# Patient Record
Sex: FEMALE | Race: WHITE | Employment: FULL TIME | URBAN - METROPOLITAN AREA
[De-identification: names, ages, dates, MRNs, and addresses within clinical notes are randomized per-mention and may not be internally consistent; named-entity substitution may affect disease eponyms.]

---

## 2021-07-07 ENCOUNTER — OFFICE VISIT (OUTPATIENT)
Dept: FAMILY MEDICINE CLINIC | Age: 26
End: 2021-07-07
Payer: COMMERCIAL

## 2021-07-07 VITALS
OXYGEN SATURATION: 99 % | DIASTOLIC BLOOD PRESSURE: 62 MMHG | WEIGHT: 175 LBS | SYSTOLIC BLOOD PRESSURE: 120 MMHG | HEART RATE: 87 BPM | HEIGHT: 68 IN | BODY MASS INDEX: 26.52 KG/M2

## 2021-07-07 DIAGNOSIS — K92.1 HEMATOCHEZIA: Primary | ICD-10-CM

## 2021-07-07 PROCEDURE — 99204 OFFICE O/P NEW MOD 45 MIN: CPT | Performed by: FAMILY MEDICINE

## 2021-07-07 RX ORDER — COPPER 313.4 MG/1
1 INTRAUTERINE DEVICE INTRAUTERINE ONCE
COMMUNITY
End: 2022-03-16

## 2021-07-07 SDOH — ECONOMIC STABILITY: FOOD INSECURITY: WITHIN THE PAST 12 MONTHS, THE FOOD YOU BOUGHT JUST DIDN'T LAST AND YOU DIDN'T HAVE MONEY TO GET MORE.: NEVER TRUE

## 2021-07-07 SDOH — ECONOMIC STABILITY: FOOD INSECURITY: WITHIN THE PAST 12 MONTHS, YOU WORRIED THAT YOUR FOOD WOULD RUN OUT BEFORE YOU GOT MONEY TO BUY MORE.: NEVER TRUE

## 2021-07-07 ASSESSMENT — PATIENT HEALTH QUESTIONNAIRE - PHQ9
SUM OF ALL RESPONSES TO PHQ9 QUESTIONS 1 & 2: 0
SUM OF ALL RESPONSES TO PHQ QUESTIONS 1-9: 0
2. FEELING DOWN, DEPRESSED OR HOPELESS: 0
1. LITTLE INTEREST OR PLEASURE IN DOING THINGS: 0

## 2021-07-07 ASSESSMENT — ENCOUNTER SYMPTOMS
RECTAL PAIN: 0
VOMITING: 0
SHORTNESS OF BREATH: 0
SORE THROAT: 0
BLOOD IN STOOL: 1
CHEST TIGHTNESS: 0
DIARRHEA: 0
RHINORRHEA: 0
NAUSEA: 0
ABDOMINAL DISTENTION: 0
CONSTIPATION: 0
ABDOMINAL PAIN: 0
ANAL BLEEDING: 1

## 2021-07-07 ASSESSMENT — SOCIAL DETERMINANTS OF HEALTH (SDOH): HOW HARD IS IT FOR YOU TO PAY FOR THE VERY BASICS LIKE FOOD, HOUSING, MEDICAL CARE, AND HEATING?: NOT HARD AT ALL

## 2021-07-07 NOTE — PROGRESS NOTES
Subjective:   Patient ID: Christine Vega is a 31 y.o. female here today to establish care. HPI by clinical support staff:   Chief Complaint   Patient presents with    New Patient    Rectal Bleeding     Menstrual period last week, but noticed blood in stool yesterday morning as well as this morning. History of yeast infections. Preliminary data above this line collected by clinical support staff.    ______________________________________________________________________  HPI by Provider:   HPI   Patient presents to establish care. History reviewed and updated with patient today. Reports noticing blood in her stool a week ago but was on her period so didn't think much of it. Noticed BRB in the toilet bowl yesterday and no blood in vaginal vault. Denies pain with defecation, diarrhea or history of hemorrhoids. Has a family history of laboy syndrome in maternal aunt at a young age (35s). Had beet juice but before this occurrence. No treatment so far. Data above this line collected by Provider. Patient's medications, allergies, past medical, surgical, social and family histories were reviewed and updated as appropriate. Patient Care Team:  Fabiola Macdonald MD as PCP - General (Family Medicine)    No Known Allergies    Current Outpatient Medications on File Prior to Visit   Medication Sig Dispense Refill    Lisdexamfetamine Dimesylate (VYVANSE) 20 MG CAPS Take 20 mg by mouth daily as needed.  Paragard Intrauterine Copper IUD 1 each by Intrauterine route once       No current facility-administered medications on file prior to visit. Review of Systems   Constitutional: Negative for activity change, appetite change, chills, fatigue and fever. HENT: Negative for congestion, rhinorrhea and sore throat. Respiratory: Negative for chest tightness and shortness of breath. Cardiovascular: Negative for chest pain, palpitations and leg swelling.    Gastrointestinal: Positive for anal bleeding and blood in stool. Negative for abdominal distention, abdominal pain, constipation, diarrhea, nausea, rectal pain and vomiting. Genitourinary: Negative for dysuria, frequency, hematuria, menstrual problem, pelvic pain, vaginal bleeding and vaginal discharge. Musculoskeletal: Negative for arthralgias. Neurological: Negative for dizziness, weakness and headaches. Psychiatric/Behavioral: Negative for hallucinations. All other systems reviewed and are negative. ROS above this line reviewed by Provider. Objective:   /62 (Site: Right Upper Arm, Position: Sitting, Cuff Size: Small Adult)   Pulse 87   Ht 5' 8\" (1.727 m)   Wt 175 lb (79.4 kg)   SpO2 99%   BMI 26.61 kg/m²   Physical Exam  Vitals and nursing note reviewed. Constitutional:       General: She is not in acute distress. Appearance: Normal appearance. She is not ill-appearing, toxic-appearing or diaphoretic. HENT:      Head: Normocephalic and atraumatic. Mouth/Throat:      Mouth: Mucous membranes are moist.      Pharynx: Oropharynx is clear. No oropharyngeal exudate or posterior oropharyngeal erythema. Eyes:      General: No scleral icterus. Conjunctiva/sclera: Conjunctivae normal.   Cardiovascular:      Rate and Rhythm: Normal rate and regular rhythm. Heart sounds: Normal heart sounds. No murmur heard. No friction rub. No gallop. Pulmonary:      Effort: Pulmonary effort is normal.   Abdominal:      General: Abdomen is flat. Bowel sounds are normal. There is no distension. Palpations: Abdomen is soft. Tenderness: There is no abdominal tenderness. There is no guarding. Genitourinary:     Rectum: Normal. Guaiac result positive. Musculoskeletal:      Cervical back: Normal range of motion. Skin:     General: Skin is warm and dry. Capillary Refill: Capillary refill takes less than 2 seconds. Neurological:      Mental Status: She is alert.    Psychiatric:         Mood and Affect: Mood normal. Behavior: Behavior normal.       No results found for: WBC, HGB, HCT, MCV, PLT  No results found for: NA, K, BUN, CREATININE, GLUCOSE, CALCIUM, BILITOT, ALKPHOS, AST, ALT, GFRAA  No results found for: TSHFT4, TSH, FT3  No results found for: LABA1C  No results found for: EAG  No results found for: CHOL, TRIG, HDL, LDLCHOLESTEROL, CHOLHDLRATIO  No results found for: LABMICR, IUPD50EDU  No results found for: VITD25  Assessment and Plan:   1. Hematochezia  Patient showed me pictures of stool covered in BRB, rectal exam unrevealing- no external hemorrhoids. Advised increasing fluids and fiber. Referred given family history of Wade syndrome. - [facility]         This chart note was prepared using a voice recognition dictation program. This note was reviewed for accuracy; however, addition, deletion and sound-alike word errors may occur. If there are any questions regarding this chart note, please contact the originating provider. Electronically signed by   Edgar Dooley MD  7/7/2021   8:53 PM    No follow-ups on file.

## 2021-07-07 NOTE — PATIENT INSTRUCTIONS
Patient Education        Rectal Bleeding: Care Instructions  Your Care Instructions     Rectal bleeding in small amounts is common. You may see red spotting on toilet paper or drops of blood in the toilet. Rectal bleeding has many possible causes, from something as minor as hemorrhoids to something as serious as colon cancer. You may need more tests to find the cause of your bleeding. Follow-up care is a key part of your treatment and safety. Be sure to make and go to all appointments, and call your doctor if you are having problems. It's also a good idea to know your test results and keep a list of the medicines you take. How can you care for yourself at home? · Avoid aspirin and other nonsteroidal anti-inflammatory drugs (NSAIDs), such as ibuprofen (Advil, Motrin) and naproxen (Aleve). They can cause you to bleed more. Ask your doctor if you can take acetaminophen (Tylenol). Read and follow all instructions on the label. · Use a stool softener that contains bran or psyllium. You can save money by buying bran or psyllium (available in bulk at most health food stores) and sprinkling it on foods or stirring it into fruit juice. You can also use a product such as Metamucil or Citrucel. · Take your medicines exactly as directed. Call your doctor if you think you are having a problem with your medicine. When should you call for help? Call 911 anytime you think you may need emergency care. For example, call if:    · You passed out (lost consciousness). Call your doctor now or seek immediate medical care if:    · You have new or worse pain.     · You have new or worse bleeding from the rectum.     · You are dizzy or light-headed, or you feel like you may faint. Watch closely for changes in your health, and be sure to contact your doctor if:    · You cannot pass stools or gas.     · You do not get better as expected. Where can you learn more? Go to [email or web address].healthPlaceILive.com. org and sign in to your [employer] account. Enter U375 in the [facility] box to learn more about \"Rectal Bleeding: Care Instructions. \"     If you do not have an account, please click on the \"Sign Up Now\" link. Current as of: February 10, 2021               Content Version: 12.9  © 0358-6005 Healthwise, Incorporated. Care instructions adapted under license by [facility] If you have questions about a medical condition or this instruction, always ask your healthcare professional. [address] any warranty or liability for your use of this information.

## 2021-07-23 ENCOUNTER — OFFICE VISIT (OUTPATIENT)
Dept: SURGERY | Age: 26
End: 2021-07-23
Payer: COMMERCIAL

## 2021-07-23 VITALS
HEIGHT: 68 IN | SYSTOLIC BLOOD PRESSURE: 107 MMHG | HEART RATE: 73 BPM | OXYGEN SATURATION: 100 % | TEMPERATURE: 98.7 F | DIASTOLIC BLOOD PRESSURE: 71 MMHG | BODY MASS INDEX: 26.67 KG/M2 | WEIGHT: 176 LBS

## 2021-07-23 DIAGNOSIS — K62.5 RECTAL BLEEDING: Primary | ICD-10-CM

## 2021-07-23 PROCEDURE — 99203 OFFICE O/P NEW LOW 30 MIN: CPT | Performed by: SURGERY

## 2021-07-23 NOTE — Clinical Note
Corene Nageotte,    Thank you for the referral. Given her family history we will get her scheduled for colonoscopy +/- hemorrhoid treatment    Carmen Robbins

## 2021-07-23 NOTE — PROGRESS NOTES
Subjective:     Patient is a 31 y.o. woman with rectal bleeding and family history of Wade syndrome     The patient is referred by Dr. Claudette Holm MD. Results of consultation will be shared via electronic medical record    HPI: Ms. Rell Michelle reports several episodes of rectal bleeding and mucus over the course of this year. She reports she is regular without issues of constipation or diarrhea. She has had some issues with recurrent yeast infections. She rarely sees mucus on her stool. She denies current pregnancy. Patient Active Problem List    Diagnosis Date Noted    ADD (attention deficit disorder) 05/18/2015    Asthma 05/18/2015    Bigeminy 05/18/2015     Past Medical History:   Diagnosis Date    ADHD (attention deficit hyperactivity disorder)     Asthma       Past Surgical History:   Procedure Laterality Date    KNEE ARTHROSCOPY Right     TONSILLECTOMY AND ADENOIDECTOMY      WISDOM TOOTH EXTRACTION        Not in a hospital admission. No Known Allergies   Social History     Tobacco Use    Smoking status: Never Smoker    Smokeless tobacco: Never Used   Substance Use Topics    Alcohol use: Yes     Alcohol/week: 1.0 - 2.0 standard drinks     Types: 1 - 2 Standard drinks or equivalent per week      Family History   Problem Relation Age of Onset    High Blood Pressure Mother     No Known Problems Father     No Known Problems Sister     No Known Problems Brother     No Known Problems Maternal Grandmother     Heart Surgery Maternal Grandfather     Clotting Disorder Paternal Grandmother     Heart Surgery Paternal Grandfather     Coronary Art Dis Paternal Grandfather     Colon Cancer Maternal Aunt 39    Breast Cancer Maternal Aunt       Review of Systems    GEN: reviewed and negative except as noted in HPI. GI: reviewed and negative except as noted in HPI. + bleeding, no constipation or diarrhea.     Objective:     GEN: appears well, no distress, appears stated age  PSYCH: normal mood, normal affect  NECK: no neck masses, trachea midline  ENT: moist oral mucosa; anicteric  SKIN: no rash or jaundice  CV: regular heart rate and rhythm  PULM: normal respiratory effort, no wheezing  GI: soft non tender abdomen. Normal bowel sounds  RECTAL: deferred to time of colonoscopy   EXT/NEURO: normal gait, strength/sensation grossly intact in all extremities    Assessment:     Rectal bleeding     Plan:     Discussed in setting of FH of Wade syndrome and aunt with colon cancer in her 29's along with bleeding and mucus I recommend colonoscopy to exclude malignancy. Discussed we can perform hemorrhoid banding at same time if internal hemorrhoids are seen.     Amy Harrington M.D.  7/23/21   9:56 AM

## 2021-07-26 ENCOUNTER — TELEPHONE (OUTPATIENT)
Dept: SURGERY | Age: 26
End: 2021-07-26

## 2021-07-26 NOTE — TELEPHONE ENCOUNTER
Left a voicemail for patient, we need to bump up her arrival time.   She needs to arrive at [facility]. at 9:00AM for her colonoscopy on 7/29/21

## 2021-07-27 NOTE — PROGRESS NOTES
ENDOSCOPY PREOP PHONE INTERVIEW  INSTRUCTIONS:  All patients having anesthesia or sedation are required to be Covid tested OR to have been vaccinated at least 14 days prior to your procedure. It is very important to return our call to [phone] and notify the staff of your last vaccination date otherwise you will be required to complete Covid PCR test within the 5-6 days prior to surgery & quarantine. The results will need to be faxed to PreAdmission Testing at [phone]. Please follow all instructions / preps given to you from your doctor's office. If you have not received these instructions yet, please call the office immediately. Make sure to read them once received. Day of the procedure:    Enter the MAIN entrance located on [facility] and report to the desk on left side of the lobby. Arrival Time: PER DR. Yumiko Lee OFFICE for your procedure scheduled at: [address] [place]. No lotion, powders or jewelry   Bring your insurance & photo ID with you.  Bring an accurate list of your medications with doses/ frequency with you day of the procedure, including over the counter medications. If you are taking blood thinners, ASA or diabetic medication, make sure to call Dr. Yumiko Lee  or your PCP for instructions prior to your procedure.  Arrange for someone to be with you and sign you out & drive you home after your procedure.     We are allowing 1 visitor with you in the hospital.    [address] [place] AGE: Please make sure to be able to give a urine sample on arrival- YES WILL NEED      If you have further questions, you may contact your Endoscopist's office or Pre Admission Testing staff at [phone]  Ko Martini RN.7/27/2021 .11:08 AM

## 2021-07-28 ENCOUNTER — ANESTHESIA EVENT (OUTPATIENT)
Dept: ENDOSCOPY | Age: 26
End: 2021-07-28
Payer: COMMERCIAL

## 2021-07-28 ENCOUNTER — TELEPHONE (OUTPATIENT)
Dept: SURGERY | Age: 26
End: 2021-07-28

## 2021-07-28 NOTE — TELEPHONE ENCOUNTER
I have placed a reminder call to patient for upcoming procedure. Did you speak directly to patient or leave a voicemail? Spoke with patient    Follow clear liquids the day before procedure. Start bowel prep at 4:00pm    Arrive at the main entrance [facility] at 9:15AM    Patient voiced understanding of prep and arrival time. Advised to wear glasses tomorrow and not contacts.

## 2021-07-29 ENCOUNTER — ANESTHESIA (OUTPATIENT)
Dept: ENDOSCOPY | Age: 26
End: 2021-07-29
Payer: COMMERCIAL

## 2021-07-29 ENCOUNTER — HOSPITAL ENCOUNTER (OUTPATIENT)
Age: 26
Setting detail: OUTPATIENT SURGERY
Discharge: HOME | End: 2021-07-29
Attending: SURGERY | Admitting: SURGERY
Payer: COMMERCIAL

## 2021-07-29 VITALS
HEART RATE: 57 BPM | RESPIRATION RATE: 16 BRPM | BODY MASS INDEX: 26.22 KG/M2 | WEIGHT: 173 LBS | DIASTOLIC BLOOD PRESSURE: 76 MMHG | OXYGEN SATURATION: 100 % | SYSTOLIC BLOOD PRESSURE: 107 MMHG | TEMPERATURE: 97.3 F | HEIGHT: 68 IN

## 2021-07-29 VITALS — SYSTOLIC BLOOD PRESSURE: 99 MMHG | DIASTOLIC BLOOD PRESSURE: 63 MMHG | OXYGEN SATURATION: 100 %

## 2021-07-29 DIAGNOSIS — K62.5 RECTAL BLEEDING: ICD-10-CM

## 2021-07-29 LAB — PREGNANCY, URINE: NEGATIVE

## 2021-07-29 PROCEDURE — 45380 COLONOSCOPY AND BIOPSY: CPT | Performed by: SURGERY

## 2021-07-29 PROCEDURE — 2709999900 HC NON-CHARGEABLE SUPPLY: Performed by: SURGERY

## 2021-07-29 PROCEDURE — 7100000011 HC PHASE II RECOVERY - ADDTL 15 MIN: Performed by: SURGERY

## 2021-07-29 PROCEDURE — 3609009900 HC COLONOSCOPY W/CONTROL BLEEDING ANY METHOD: Performed by: SURGERY

## 2021-07-29 PROCEDURE — 3700000000 HC ANESTHESIA ATTENDED CARE: Performed by: SURGERY

## 2021-07-29 PROCEDURE — 6360000002 HC RX W HCPCS: Performed by: NURSE ANESTHETIST, CERTIFIED REGISTERED

## 2021-07-29 PROCEDURE — 7100000010 HC PHASE II RECOVERY - FIRST 15 MIN: Performed by: SURGERY

## 2021-07-29 PROCEDURE — 2500000003 HC RX 250 WO HCPCS: Performed by: NURSE ANESTHETIST, CERTIFIED REGISTERED

## 2021-07-29 PROCEDURE — 3700000001 HC ADD 15 MINUTES (ANESTHESIA): Performed by: SURGERY

## 2021-07-29 PROCEDURE — 3609010600 HC COLONOSCOPY POLYPECTOMY SNARE/COLD BIOPSY: Performed by: SURGERY

## 2021-07-29 PROCEDURE — 88305 TISSUE EXAM BY PATHOLOGIST: CPT

## 2021-07-29 PROCEDURE — 2580000003 HC RX 258: Performed by: ANESTHESIOLOGY

## 2021-07-29 PROCEDURE — 84703 CHORIONIC GONADOTROPIN ASSAY: CPT

## 2021-07-29 RX ORDER — SODIUM CHLORIDE 9 MG/ML
25 INJECTION, SOLUTION INTRAVENOUS PRN
Status: DISCONTINUED | OUTPATIENT
Start: 2021-07-29 | End: 2021-07-29 | Stop reason: HOSPADM

## 2021-07-29 RX ORDER — SODIUM CHLORIDE 0.9 % (FLUSH) 0.9 %
5-40 SYRINGE (ML) INJECTION EVERY 12 HOURS SCHEDULED
Status: DISCONTINUED | OUTPATIENT
Start: 2021-07-29 | End: 2021-07-29 | Stop reason: HOSPADM

## 2021-07-29 RX ORDER — FLUCONAZOLE 100 MG/1
TABLET ORAL
COMMUNITY
Start: 2021-07-13 | End: 2022-03-16

## 2021-07-29 RX ORDER — PROPOFOL 10 MG/ML
INJECTION, EMULSION INTRAVENOUS PRN
Status: DISCONTINUED | OUTPATIENT
Start: 2021-07-29 | End: 2021-07-29 | Stop reason: SDUPTHER

## 2021-07-29 RX ORDER — LIDOCAINE HYDROCHLORIDE 20 MG/ML
INJECTION, SOLUTION EPIDURAL; INFILTRATION; INTRACAUDAL; PERINEURAL PRN
Status: DISCONTINUED | OUTPATIENT
Start: 2021-07-29 | End: 2021-07-29 | Stop reason: SDUPTHER

## 2021-07-29 RX ORDER — ALBUTEROL SULFATE 90 UG/1
AEROSOL, METERED RESPIRATORY (INHALATION) EVERY 6 HOURS PRN
COMMUNITY
Start: 2021-06-28

## 2021-07-29 RX ORDER — SODIUM CHLORIDE, SODIUM LACTATE, POTASSIUM CHLORIDE, CALCIUM CHLORIDE 600; 310; 30; 20 MG/100ML; MG/100ML; MG/100ML; MG/100ML
INJECTION, SOLUTION INTRAVENOUS CONTINUOUS
Status: DISCONTINUED | OUTPATIENT
Start: 2021-07-29 | End: 2021-07-29 | Stop reason: HOSPADM

## 2021-07-29 RX ORDER — SODIUM CHLORIDE 0.9 % (FLUSH) 0.9 %
5-40 SYRINGE (ML) INJECTION PRN
Status: DISCONTINUED | OUTPATIENT
Start: 2021-07-29 | End: 2021-07-29 | Stop reason: HOSPADM

## 2021-07-29 RX ORDER — PROPOFOL 10 MG/ML
INJECTION, EMULSION INTRAVENOUS CONTINUOUS PRN
Status: DISCONTINUED | OUTPATIENT
Start: 2021-07-29 | End: 2021-07-29 | Stop reason: SDUPTHER

## 2021-07-29 RX ORDER — LIDOCAINE HYDROCHLORIDE 10 MG/ML
1 INJECTION, SOLUTION EPIDURAL; INFILTRATION; INTRACAUDAL; PERINEURAL
Status: DISCONTINUED | OUTPATIENT
Start: 2021-07-29 | End: 2021-07-29 | Stop reason: HOSPADM

## 2021-07-29 RX ADMIN — SODIUM CHLORIDE, POTASSIUM CHLORIDE, SODIUM LACTATE AND CALCIUM CHLORIDE: 600; 310; 30; 20 INJECTION, SOLUTION INTRAVENOUS at 09:47

## 2021-07-29 RX ADMIN — PROPOFOL 150 MG: 10 INJECTION, EMULSION INTRAVENOUS at 10:17

## 2021-07-29 RX ADMIN — PROPOFOL 50 MG: 10 INJECTION, EMULSION INTRAVENOUS at 10:21

## 2021-07-29 RX ADMIN — LIDOCAINE HYDROCHLORIDE 50 MG: 20 INJECTION, SOLUTION EPIDURAL; INFILTRATION; INTRACAUDAL; PERINEURAL at 10:17

## 2021-07-29 RX ADMIN — PROPOFOL 150 MCG/KG/MIN: 10 INJECTION, EMULSION INTRAVENOUS at 10:17

## 2021-07-29 ASSESSMENT — PULMONARY FUNCTION TESTS
PIF_VALUE: 1

## 2021-07-29 ASSESSMENT — PAIN - FUNCTIONAL ASSESSMENT
PAIN_FUNCTIONAL_ASSESSMENT: 0-10

## 2021-07-29 NOTE — ANESTHESIA POSTPROCEDURE EVALUATION
Department of Anesthesiology  Postprocedure Note    Patient: Hay Webb  MRN: [id number]  YOB: 1995  Date of evaluation: 7/29/2021  Time:  2:35 PM     Procedure Summary     Date: 07/29/21 Room / Location: [address] [place] [address] Darío Springer  / [facility]    Anesthesia Start: 1013 Anesthesia Stop: 1052    Procedures:       COLONOSCOPY POLYPECTOMY SNARE/COLD BIOPSY      COLONOSCOPY CONTROL HEMORRHAGE Diagnosis:       Rectal bleeding      (Rectal bleeding [K62.5], FAMILY HISTORY OF [facility] SYNDROME Z80.0)    Surgeons: Daniela Chen MD Responsible Provider: Jim Wayne MD    Anesthesia Type: MAC ASA Status: 2          Anesthesia Type: MAC    Karla Phase I: Karla Score: 10    Karla Phase II: Karla Score: 10    Last vitals: Reviewed and per EMR flowsheets.        Anesthesia Post Evaluation    Patient location during evaluation: bedside  Patient participation: complete - patient participated  Level of consciousness: awake  Pain score: 0  Airway patency: patent  Nausea & Vomiting: no nausea and no vomiting  Complications: no  Cardiovascular status: hemodynamically stable  Respiratory status: acceptable  Hydration status: euvolemic

## 2021-07-29 NOTE — OP NOTE
Operative Note      Patient: Taras Reed  YOB: 1995  MRN: [id number]    Date of Procedure: 7/29/2021    Pre-Op Diagnosis: Rectal bleeding [K62.5], FAMILY HISTORY OF [facility] SYNDROME Z80.0    Post-Op Diagnosis: SAME; Possible polyp versus rectal fold. Small hemorrhoids. Procedure(s):  COLONOSCOPY POLYPECTOMY SNARE/COLD BIOPSY  COLONOSCOPY CONTROL HEMORRHAGE    OPERATIVE NOTE      PROCEDURE: Colonoscopy with cold biopsy polyp removal. Tip of hot snare used for hemostasis. ANESTHESIA: MAC  SURGEONS: Carolyn Ramirez M.D.  ASSISTANTS: Teodoro Fritz PGY 4 MD   PREP QUALITYDelpha Roderick  ESTIMATED BLOOD LOSS: 3 cc    OPERATIVE NOTE    After informed consent was obtained the patient was taken to the endoscopy suite. Time out was called to confirm key components. We began by performing a digital rectal exam: No lesions were noted on digital exam.     We then performed colonoscopy complete to the cecum. The exam was difficult due to significant looping which was assisted with manual pressure. The prep was good. Cecum and appendiceal orifice were seen and photographed. Withdrawal time was approximately 17 minutes. A single small sessile polyp vs thickened fold of the transverse colon was removed by cold biopsy. There was a small amount of bleeding controlled using the tip of a snare as cautery. No other pathology was seen. Resection and retrieval for all lesions was complete. Good hemostasis was seen. Retroflexion showed normal rectum and anus aside from small hemorrhoids. None of these seemed large enough for banding. Dr. Royston Brittle was present and performed all portions. The patient tolerated well with no immediate complication. Follow up is recommended based on pathology.     Carolyn Ramirez M.D.  7/29/21   10:51 AM

## 2021-07-29 NOTE — H&P
Subjective:     Patient is a 31 y.o. woman here for colonoscopy    HPI: No acute changes since seen in office. Here for colonoscopy given FH of Wade and rectal bleeding    Patient Active Problem List    Diagnosis Date Noted    ADD (attention deficit disorder) 05/18/2015    Asthma 05/18/2015    Bigeminy 05/18/2015     Past Medical History:   Diagnosis Date    ADHD (attention deficit hyperactivity disorder)     Asthma       Past Surgical History:   Procedure Laterality Date    KNEE ARTHROSCOPY Right     TONSILLECTOMY AND ADENOIDECTOMY      WISDOM TOOTH EXTRACTION        Medications Prior to Admission: Lisdexamfetamine Dimesylate (VYVANSE) 20 MG CAPS, Take 20 mg by mouth daily as needed. Paragard Intrauterine Copper IUD, 1 each by Intrauterine route once  albuterol sulfate  (90 Base) MCG/ACT inhaler, every 6 hours as needed  fluconazole (DIFLUCAN) 100 MG tablet,   No Known Allergies   Social History     Tobacco Use    Smoking status: Never Smoker    Smokeless tobacco: Never Used   Substance Use Topics    Alcohol use: Yes     Alcohol/week: 1.0 - 2.0 standard drinks     Types: 1 - 2 Standard drinks or equivalent per week      Family History   Problem Relation Age of Onset    High Blood Pressure Mother     No Known Problems Father     No Known Problems Sister     No Known Problems Brother     No Known Problems Maternal Grandmother     Heart Surgery Maternal Grandfather     Clotting Disorder Paternal Grandmother     Heart Surgery Paternal Grandfather     Coronary Art Dis Paternal Grandfather     Colon Cancer Maternal Aunt 39    Breast Cancer Maternal Aunt       Review of Systems    GEN: reviewed and negative except as noted in HPI. GI: reviewed and negative except as noted in HPI.      Objective:     Patient Vitals for the past 8 hrs:   BP Temp Temp src Pulse Resp SpO2 Height Weight   07/29/21 0926 (!) 107/58 97.6 °F (36.4 °C) Temporal 59 14 100 % 5' 8\" (1.727 m) 173 lb (78.5 kg)     GEN: appears well, no distress, appears stated age  PSYCH: normal mood, normal affect  NECK: no neck masses, trachea midline  ENT: moist oral mucosa; anicteric  SKIN: no rash or jaundice  CV: regular heart rate and rhythm  PULM: normal respiratory effort, no wheezing  GI: soft non tender abdomen. Normal bowel sounds  RECTAL: deferred   EXT/NEURO: normal gait, strength/sensation grossly intact in all extremities      Assessment:     Colon screen     Plan:     The risks, benefits, and alternatives of colonoscopy +/- hemorrhoid banding were reviewed. The risks include bleeding and the very low risk of perforation which might require surgery to fix. May need additional operation/treatment based on findings. Patient understands and wishes to proceed.      Kyle Ulloa M.D.  7/29/21   10:13 AM

## 2021-07-29 NOTE — ANESTHESIA PRE PROCEDURE
Department of Anesthesiology  Preprocedure Note       Name:  Dae Caldwell   Age:  31 y.o.  :  1995                                          MRN:  [id number]         Date:  2021      Surgeon: Desi Donovan):  Jaleesa Hare MD    Procedure: Procedure(s):  COLONOSCOPY, POSSIBLE POLYPECTOMY    Medications prior to admission:   Prior to Admission medications    Medication Sig Start Date End Date Taking? Authorizing Provider   Lisdexamfetamine Dimesylate (VYVANSE) 20 MG CAPS Take 20 mg by mouth daily as needed.  3/30/21  Yes Historical Provider, MD Lawrence Intrauterine Copper IUD 1 each by Intrauterine route once   Yes Historical Provider, MD   albuterol sulfate  (90 Base) MCG/ACT inhaler every 6 hours as needed 21   Historical Provider, MD   fluconazole (DIFLUCAN) 100 MG tablet  21   Historical Provider, MD       Current medications:    Current Facility-Administered Medications   Medication Dose Route Frequency Provider Last Rate Last Admin    lactated ringers infusion   Intravenous Continuous Cayla Eason  mL/hr at 21 0947 New Bag at 21 0947    sodium chloride flush 0.9 % injection 5-40 mL  5-40 mL Intravenous 2 times per day Cayla Eason MD        sodium chloride flush 0.9 % injection 5-40 mL  5-40 mL Intravenous PRN Cayla Eason MD        0.9 % sodium chloride infusion  25 mL Intravenous PRN Cayla Eason MD        lidocaine PF 1 % injection 1 mL  1 mL Intradermal Once PRN Cayla Eason MD           Allergies:  No Known Allergies    Problem List:    Patient Active Problem List   Diagnosis Code    ADD (attention deficit disorder) F98.8    Asthma J45.909    Bigemin I49.8       Past Medical History:        Diagnosis Date    ADHD (attention deficit hyperactivity disorder)     Asthma        Past Surgical History:        Procedure Laterality Date    KNEE ARTHROSCOPY Right     TONSILLECTOMY AND ADENOIDECTOMY      WISDOM TOOTH EXTRACTION Social History:    Social History     Tobacco Use    Smoking status: Never Smoker    Smokeless tobacco: Never Used   Substance Use Topics    Alcohol use: Yes     Alcohol/week: 1.0 - 2.0 standard drinks     Types: 1 - 2 Standard drinks or equivalent per week                                Counseling given: Not Answered      Vital Signs (Current):   Vitals:    07/29/21 0926   BP: (!) 107/58   Pulse: 59   Resp: 14   Temp: 97.6 °F (36.4 °C)   TempSrc: Temporal   SpO2: 100%   Weight: 173 lb (78.5 kg)   Height: 5' 8\" (1.727 m)                                              BP Readings from Last 3 Encounters:   07/29/21 (!) 107/58   07/23/21 107/71   07/07/21 120/62       NPO Status: Time of last liquid consumption: 0030                        Time of last solid consumption: 2200                        Date of last liquid consumption: 07/29/21                        Date of last solid food consumption: 07/27/21    BMI:   Wt Readings from Last 3 Encounters:   07/29/21 173 lb (78.5 kg)   07/23/21 176 lb (79.8 kg)   07/07/21 175 lb (79.4 kg)     Body mass index is 26.3 kg/m². CBC: No results found for: WBC, RBC, HGB, HCT, MCV, RDW, PLT    CMP: No results found for: NA, K, CL, CO2, BUN, CREATININE, GFRAA, AGRATIO, LABGLOM, GLUCOSE, PROT, CALCIUM, BILITOT, ALKPHOS, AST, ALT    POC Tests: No results for input(s): POCGLU, POCNA, POCK, POCCL, POCBUN, POCHEMO, POCHCT in the last 72 hours.     Coags: No results found for: PROTIME, INR, APTT    HCG (If Applicable):   Lab Results   Component Value Date    PREGTESTUR Negative 07/29/2021        ABGs: No results found for: PHART, PO2ART, YBU7YOT, OUO0PLE, BEART, Q0LKQQFT     Type & Screen (If Applicable):  No results found for: LABABO, LABRH    Drug/Infectious Status (If Applicable):  No results found for: HIV, HEPCAB    COVID-19 Screening (If Applicable): No results found for: COVID19        Anesthesia Evaluation    Airway: Mallampati: II  TM distance: >3 FB   Neck ROM: full  Mouth opening: > = 3 FB Dental:          Pulmonary:   (+) asthma:                            Cardiovascular:  Exercise tolerance: good (>4 METS),   (+) dysrhythmias:,     (-) past MI and  angina                Neuro/Psych:      (-) seizures           GI/Hepatic/Renal:   (+) GERD:,           Endo/Other:        (-) diabetes mellitus               Abdominal:             Vascular: Other Findings:             Anesthesia Plan      MAC     ASA 2     (-npo MN  -bigeminy found as a kid, she has not had sx in many years, does not even recall what her sx were to be diagnosed)  Induction: intravenous. Anesthetic plan and risks discussed with patient. Plan discussed with CRNA.                   Belem Knox MD   7/29/2021

## 2021-07-29 NOTE — PROGRESS NOTES
Ambulatory Surgery/Procedure Discharge Note    Patient tolerated procedure well. Patient denies nausea, vomiting or pain post procedure. Discharge instructions and education reviewed with patient and spouse. Written discharge instructions and education provided at discharge. Patient discharged ambulatory to car. Spouse to drive patient home. Vitals:    07/29/21 1127   BP: 107/76   Pulse: 57   Resp: 16   Temp:    SpO2: 100%       No intake/output data recorded. Restroom use offered before discharge. Yes    Pain assessment:  none  Pain Level: 0        Patient discharged to home/self care.  Patient discharged via wheel chair by transporter to waiting family/S.O.       7/29/2021 11:35 AM

## 2021-08-06 ENCOUNTER — TELEPHONE (OUTPATIENT)
Dept: SURGERY | Age: 26
End: 2021-08-06

## 2021-08-06 NOTE — TELEPHONE ENCOUNTER
Discussed findings of adenoma on colonoscopy    Given her age and FH I recommend 2 year interval colonoscopy    Sherrie Parks - can you place in 2 year recall?  Thanks    Wilberto Fox M.D.  8/6/21   11:21 AM

## 2021-09-20 LAB — PAP SMEAR, EXTERNAL: NORMAL

## 2022-02-02 ENCOUNTER — TELEPHONE (OUTPATIENT)
Dept: FAMILY MEDICINE CLINIC | Age: 27
End: 2022-02-02

## 2022-02-02 ENCOUNTER — TELEPHONE (OUTPATIENT)
Dept: PRIMARY CARE CLINIC | Age: 27
End: 2022-02-02

## 2022-02-02 NOTE — TELEPHONE ENCOUNTER
----- Message from Abelardo Mayo sent at 2/2/2022  1:09 PM EST -----  Subject: Appointment Request    Reason for Call: Routine Physical Exam    QUESTIONS  Type of Appointment? Established Patient  Reason for appointment request? No appointments available during search  Additional Information for Provider? Pt did not know that you moved and   would like to reschedule but on the ecc part it says we can not schedule. Please call her back at [phone]  ---------------------------------------------------------------------------  --------------  [address]  What is the best way for the office to contact you? OK to leave message on   voicemail  Preferred Call Back Phone Number? [phone]  ---------------------------------------------------------------------------  --------------  SCRIPT ANSWERS  Relationship to Patient? Self  Have your symptoms changed? No  (If the patient has Medicare as their primary insurance coverage ask this   question) Are you requesting a Medicare Annual Wellness Visit? No  (Is the patient requesting a pap smear with their physical exam?)? No  (Is the patient requesting their annual physical and does not need PAP or   AWV per above?)? Yes   Have you been diagnosed with, awaiting test results for, or told that you   are suspected of having COVID-19 (Coronavirus)? (If patient has tested   negative or was tested as a requirement for work, school, or travel and   not based on symptoms, answer no)? No  Within the past two weeks have you developed any of the following symptoms   (answer no if symptoms have been present longer than 2 weeks or began   more than 2 weeks ago)? Fever or Chills, Cough, Shortness of breath or   difficulty breathing, Loss of taste or smell, Sore throat, Nasal   congestion, Sneezing or runny nose, Fatigue or generalized body aches   (answer no if pain is specific to a body part e.g. back pain), Diarrhea,   Headache?  No  Have you had close contact with someone with COVID-19 in the last 14 days? No  (Service Expert  click yes below to proceed with [employer] As Usual   Scheduling)?  Yes

## 2022-02-02 NOTE — TELEPHONE ENCOUNTER
----- Message from Dalton Celine sent at 2/2/2022  1:09 PM EST -----  Subject: Appointment Request    Reason for Call: Routine Physical Exam    QUESTIONS  Type of Appointment? Established Patient  Reason for appointment request? No appointments available during search  Additional Information for Provider? Pt did not know that you moved and   would like to reschedule but on the ecc part it says we can not schedule. Please call her back at [phone]  ---------------------------------------------------------------------------  --------------  [address]  What is the best way for the office to contact you? OK to leave message on   voicemail  Preferred Call Back Phone Number? [phone]  ---------------------------------------------------------------------------  --------------  SCRIPT ANSWERS  Relationship to Patient? Self  Have your symptoms changed? No  (If the patient has Medicare as their primary insurance coverage ask this   question) Are you requesting a Medicare Annual Wellness Visit? No  (Is the patient requesting a pap smear with their physical exam?)? No  (Is the patient requesting their annual physical and does not need PAP or   AWV per above?)? Yes   Have you been diagnosed with, awaiting test results for, or told that you   are suspected of having COVID-19 (Coronavirus)? (If patient has tested   negative or was tested as a requirement for work, school, or travel and   not based on symptoms, answer no)? No  Within the past two weeks have you developed any of the following symptoms   (answer no if symptoms have been present longer than 2 weeks or began   more than 2 weeks ago)? Fever or Chills, Cough, Shortness of breath or   difficulty breathing, Loss of taste or smell, Sore throat, Nasal   congestion, Sneezing or runny nose, Fatigue or generalized body aches   (answer no if pain is specific to a body part e.g. back pain), Diarrhea,   Headache?  No  Have you had close contact with someone with COVID-19 in the last 14 days? No  (Service Expert  click yes below to proceed with [employer] As Usual   Scheduling)?  Yes

## 2022-03-15 ASSESSMENT — ENCOUNTER SYMPTOMS
SHORTNESS OF BREATH: 0
DIARRHEA: 0
RHINORRHEA: 0
SORE THROAT: 0
CHEST TIGHTNESS: 0
CONSTIPATION: 0
ABDOMINAL PAIN: 0

## 2022-03-15 NOTE — PROGRESS NOTES
Annual Exam  SUBJECTIVE  Precious Roman is a 31 y.o. female who presents for an annual exam- last exam was 2020. Patient reports vaginal and pelvic discomfort with sexual intercourse. States that symptoms are more pronounced during intercourse and makes it difficult to practice certain positions. Has seen about 4 gynecologist without resolution in her symptoms. Was put on Diflucan for 3 months and did have a transvaginal ultrasound that was unremarkable except for a malpositioned IUD which was removed. States that she otherwise has a very strong smell to her urine. Believes she drinks at least 2 L of water daily has been going to the [facility] 4 days a week at [facility]. Chief Complaint   Patient presents with    Annual Exam     no concerns today, last PAP in september- appt next week     PHQ-2/9 Depression Screening from Assessments   PHQ9 Depression scale:   PHQ Scores 3/16/2022 7/7/2021   PHQ2 Score 0 0   PHQ9 Score 0 0      (1-4 = Minimal depression, 5-9 = Mild depression, 10-14 = Moderate depression, 15-19 = Moderately severe depression, 20-27 = Severe depression)   MENSTRUAL HISTORY  Patient's last menstrual period was 03/02/2022 (approximate). Health Maintenance Due   Topic Date Due    Varicella vaccine (1 of 2 - 2-dose childhood series) Never done    COVID-19 Vaccine (1) Never done    Pneumococcal 0-64 years Vaccine (1 of 2 - PPSV23) Never done    DTaP/Tdap/Td vaccine (1 - Tdap) Never done    Flu vaccine (1) 09/01/2021     Current Outpatient Medications on File Prior to Visit   Medication Sig Dispense Refill    albuterol sulfate  (90 Base) MCG/ACT inhaler every 6 hours as needed      Lisdexamfetamine Dimesylate (VYVANSE) 20 MG CAPS Take 20 mg by mouth daily as needed. No current facility-administered medications on file prior to visit.       Past Medical History:   Diagnosis Date    ADHD (attention deficit hyperactivity disorder)     Asthma     Irregular heart beat      Past Surgical History:   Procedure Laterality Date    COLONOSCOPY  7/29/2021    COLONOSCOPY POLYPECTOMY SNARE/COLD BIOPSY performed by Abigail Rudd MD at [place] [address]  7/29/2021    COLONOSCOPY CONTROL HEMORRHAGE performed by Abigail Rudd MD at [address] [place] [address] ARTHROSCOPY Right     TONSILLECTOMY AND ADENOIDECTOMY      WISDOM TOOTH EXTRACTION       No Known Allergies  Social History     Socioeconomic History    Marital status:      Spouse name: Garrick Larios Number of children: 0    Years of education: 12    Highest education level: Bachelor's degree (e.g., BA, AB, BS)   Occupational History    Not on file   Tobacco Use    Smoking status: Never Smoker    Smokeless tobacco: Never Used   Vaping Use    Vaping Use: Never used   Substance and Sexual Activity    Alcohol use: Yes     Alcohol/week: 1.0 - 2.0 standard drink     Types: 1 - 2 Standard drinks or equivalent per week    Drug use: Never    Sexual activity: Yes     Partners: Male     Birth control/protection: I.U.D. Comment: 2013- [employer]   Other Topics Concern    Not on file   Social History Narrative    Not on file     Social Determinants of Health     Financial Resource Strain: Low Risk     Difficulty of Paying Living Expenses: Not hard at all   Food Insecurity: No Food Insecurity    Worried About Running Out of Food in the Last Year: Never true    [address] in the Last Year: Never true   Transportation Needs:     Lack of Transportation (Medical): Not on file    Lack of Transportation (Non-Medical):  Not on file   Physical Activity:     Days of Exercise per Week: Not on file    Minutes of Exercise per Session: Not on file   Stress:     Feeling of Stress : Not on file   Social Connections:     Frequency of Communication with Friends and Family: Not on file    Frequency of Social Gatherings with Friends and Family: Not on file    Attends Yazidism Services: Not on file    Active Member of Clubs or Organizations: Not on file    Attends Club or Organization Meetings: Not on file    Marital Status: Not on file   Intimate Partner Violence:     Fear of Current or Ex-Partner: Not on file    Emotionally Abused: Not on file    Physically Abused: Not on file    Sexually Abused: Not on file   Housing Stability:     Unable to Pay for Housing in the Last Year: Not on file    Number of [place] in the Last Year: Not on file    Unstable Housing in the Last Year: Not on file     Review of Systems   Constitutional: Negative for activity change, appetite change, fatigue and fever. HENT: Negative for congestion, rhinorrhea and sore throat. Respiratory: Negative for chest tightness and shortness of breath. Cardiovascular: Negative for chest pain, palpitations and leg swelling. Gastrointestinal: Negative for abdominal pain, constipation and diarrhea. Genitourinary: Negative for dysuria and frequency. Musculoskeletal: Negative for arthralgias. Neurological: Negative for dizziness, weakness and headaches. Psychiatric/Behavioral: Negative for hallucinations. All other systems reviewed and are negative. OBJECTIVE  /72 (Site: Left Upper Arm, Position: Sitting)   Pulse 82   Temp 97.3 °F (36.3 °C) (Temporal)   Ht 5' 8\" (1.727 m)   Wt 183 lb 6.4 oz (83.2 kg)   LMP 03/02/2022 (Approximate)   SpO2 99%   BMI 27.89 kg/m²   Physical Exam  Vitals and nursing note reviewed. Constitutional:       General: She is not in acute distress. Appearance: Normal appearance. She is normal weight. She is not ill-appearing, toxic-appearing or diaphoretic. HENT:      Head: Normocephalic and atraumatic. Right Ear: Tympanic membrane, ear canal and external ear normal. There is no impacted cerumen. Left Ear: Tympanic membrane, ear canal and external ear normal. There is no impacted cerumen.       Nose: Nose normal. No congestion. Mouth/Throat:      Mouth: Mucous membranes are moist.      Pharynx: No oropharyngeal exudate or posterior oropharyngeal erythema. Eyes:      General: No scleral icterus. Conjunctiva/sclera: Conjunctivae normal.   Cardiovascular:      Rate and Rhythm: Normal rate and regular rhythm. Heart sounds: Normal heart sounds. No murmur heard. No friction rub. No gallop. Pulmonary:      Effort: Pulmonary effort is normal. No respiratory distress. Breath sounds: Normal breath sounds. No stridor. No wheezing, rhonchi or rales. Abdominal:      General: Abdomen is flat. Bowel sounds are normal. There is no distension. Palpations: Abdomen is soft. Tenderness: There is no abdominal tenderness. Musculoskeletal:      Cervical back: Normal range of motion and neck supple. Skin:     General: Skin is warm and dry. Neurological:      General: No focal deficit present. Mental Status: She is alert. Psychiatric:         Mood and Affect: Mood normal.         Behavior: Behavior normal.        Labs  No results found for: WBC, HGB, HCT, PLT, CHOL, TRIG, HDL, LDL, ALT, AST, NA, K, CREATININE, BUN, TSH, PSA, INR, GLUF, MICROALBUR  ASSESSMENT AND PLAN   1. Preventative health care  During this preventive care visit, the following were discussed and/or reviewed for appropriateness for this patient:  · Healthy diet and exercise habits. · Importance of self skin exam and sun protection  · Age recommendations for breast and cervical cancer screening. Reviewed current differences in recommendations and risks/benefits of screening. · Reviewed need for lipid and glucose screening. · Reviewed immunization history. · Need for HIV or additional STD screening. · Need for tobacco/substance abuse cessation. · Appropriate quantity of alcohol intake. Further studies/referrals as noted. Recommend annual visit and as needed.   - CBC with Auto Differential; Future  - Comprehensive Metabolic Panel; Future  - Hemoglobin A1C; Future  - TSH with Reflex to FT4; Future  - Lipid Panel; Future    2. Pelvic pain  Low suspicion for pelvic congestion. Trial boric acid suppositories for pH concerns. No sexual intercourse while on it. - POCT Urinalysis no Micro  - Boric Acid Vaginal 600 MG SUPP; Place 1 suppository vaginally nightly for 7 days  Dispense: 7 suppository; Refill: 0       This chart note was prepared using a voice recognition dictation program.This note was reviewed for accuracy; however, addition,deletion and sound-alike word errors may occur. If there is any question regarding this chart note, please contact the originating provider.     Electronically signed by  Holden Flor MD  3/16/2022   10:24 AM    Return in about 1 year (around 3/16/2023) for Annual exam.

## 2022-03-15 NOTE — PATIENT INSTRUCTIONS
Patient Education        Eating Healthy Foods: Care Instructions  Your Care Instructions     Eating healthy foods can help lower your risk for disease. Healthy food gives you energy and keeps your heart strong, your brain active, your muscles working, and your bones strong. A healthy diet includes a variety of foods from the basic food groups: grains, vegetables, fruits, milk and milk products, and meat and beans. Some people may eat more of their favorite foods from only one food group and, as a result, miss getting the nutrients they need. So, it is important to pay attention not only to what you eat but also to what you are missing from your diet. You can eat a healthy, balanced diet by making a few small changes. Follow-up care is a key part of your treatment and safety. Be sure to make and go to all appointments, and call your doctor if you are having problems. It's also a good idea to know your test results and keep a list of the medicines you take. How can you care for yourself at home? Look at what you eat  · Keep a food diary for a week or two and record everything you eat or drink. Track the number of servings you eat from each food group. · For a balanced diet every day, eat a variety of:  ? 6 or more ounce-equivalents of grains, such as cereals, breads, crackers, rice, or pasta, every day. An ounce-equivalent is 1 slice of bread, 1 cup of ready-to-eat cereal, or ½ cup of cooked rice, cooked pasta, or cooked cereal.  ? 2½ cups of vegetables, especially:  § Dark-green vegetables such as broccoli and spinach. § Orange vegetables such as carrots and sweet potatoes. § Dry beans (such as coronel and kidney beans) and peas (such as lentils). ? 2 cups of fresh, frozen, or canned fruit. A small apple or 1 banana or orange equals 1 cup. ? 3 cups of nonfat or low-fat milk, yogurt, or other milk products. ? 5½ ounces of meat and beans, such as chicken, fish, lean meat, beans, nuts, and seeds.  One egg, 1 tablespoon of peanut butter, ½ ounce nuts or seeds, or ¼ cup of cooked beans equals 1 ounce of meat. · Learn how to read food labels for serving sizes and ingredients. Fast-food and convenience-food meals often contain few or no fruits or vegetables. Make sure you eat some fruits and vegetables to make the meal more nutritious. · Look at your food diary. For each food group, add up what you have eaten and then divide the total by the number of days. This will give you an idea of how much you are eating from each food group. See if you can find some ways to change your diet to make it more healthy. Start small  · Do not try to make dramatic changes to your diet all at once. You might feel that you are missing out on your favorite foods and then be more likely to fail. · Start slowly, and gradually change your habits. Try some of the following:  ? Use whole wheat bread instead of white bread. ? Use nonfat or low-fat milk instead of whole milk. ? Eat brown rice instead of white rice, and eat whole wheat pasta instead of white-flour pasta. ? Try low-fat cheeses and low-fat yogurt. ? Add more fruits and vegetables to meals and have them for snacks. ? Add lettuce, tomato, cucumber, and onion to sandwiches. ? Add fruit to yogurt and cereal.  Enjoy food  · You can still eat your favorite foods. You just may need to eat less of them. If your favorite foods are high in fat, salt, and sugar, limit how often you eat them, but do not cut them out entirely. · Eat a wide variety of foods. Make healthy choices when eating out  · The type of restaurant you choose can help you make healthy choices. Even fast-food chains are now offering more low-fat or healthier choices on the menu. · Choose smaller portions, or take half of your meal home. · When eating out, try:  ? A veggie pizza with a whole wheat crust or grilled chicken (instead of sausage or pepperoni).   ? Pasta with roasted vegetables, grilled chicken, or marinara sauce instead of cream sauce. ? A vegetable wrap or grilled chicken wrap. ? Broiled or poached food instead of fried or breaded items. Make healthy choices easy  · Buy packaged, prewashed, ready-to-eat fresh vegetables and fruits, such as baby carrots, salad mixes, and chopped or shredded broccoli and cauliflower. · Buy packaged, presliced fruits, such as melon or pineapple. · Choose 100% fruit or vegetable juice instead of soda. Limit juice intake to 4 to 6 oz (½ to ¾ cup) a day. · Blend low-fat yogurt, fruit juice, and canned or frozen fruit to make a smoothie for breakfast or a snack. Where can you learn more? Go to https://OcutronicspePlantSenseeb.Offerial. org and sign in to your [employer] account. Enter A835 in the Nomanini box to learn more about \"Eating Healthy Foods: Care Instructions. \"     If you do not have an account, please click on the \"Sign Up Now\" link. Current as of: September 8, 2021               Content Version: 13.1  © 2006-2021 Healthwise, Incorporated. Care instructions adapted under license by [facility] ([facility]). If you have questions about a medical condition or this instruction, always ask your healthcare professional. [address] any warranty or liability for your use of this information.

## 2022-03-16 ENCOUNTER — TELEPHONE (OUTPATIENT)
Dept: PRIMARY CARE CLINIC | Age: 27
End: 2022-03-16

## 2022-03-16 ENCOUNTER — OFFICE VISIT (OUTPATIENT)
Dept: PRIMARY CARE CLINIC | Age: 27
End: 2022-03-16
Payer: COMMERCIAL

## 2022-03-16 VITALS
DIASTOLIC BLOOD PRESSURE: 72 MMHG | OXYGEN SATURATION: 99 % | WEIGHT: 183.4 LBS | SYSTOLIC BLOOD PRESSURE: 118 MMHG | HEART RATE: 82 BPM | BODY MASS INDEX: 27.8 KG/M2 | TEMPERATURE: 97.3 F | HEIGHT: 68 IN

## 2022-03-16 DIAGNOSIS — Z00.00 PREVENTATIVE HEALTH CARE: Primary | ICD-10-CM

## 2022-03-16 DIAGNOSIS — R10.2 PELVIC PAIN: ICD-10-CM

## 2022-03-16 LAB
BILIRUBIN, POC: NORMAL
BLOOD URINE, POC: NORMAL
CLARITY, POC: CLEAR
COLOR, POC: NORMAL
GLUCOSE URINE, POC: NORMAL
KETONES, POC: NORMAL
LEUKOCYTE EST, POC: NORMAL
NITRITE, POC: NORMAL
PH, POC: 6
PROTEIN, POC: NORMAL
SPECIFIC GRAVITY, POC: 1.02
UROBILINOGEN, POC: 0.2

## 2022-03-16 PROCEDURE — 99385 PREV VISIT NEW AGE 18-39: CPT | Performed by: FAMILY MEDICINE

## 2022-03-16 PROCEDURE — 81002 URINALYSIS NONAUTO W/O SCOPE: CPT | Performed by: FAMILY MEDICINE

## 2022-03-16 ASSESSMENT — PATIENT HEALTH QUESTIONNAIRE - PHQ9
SUM OF ALL RESPONSES TO PHQ QUESTIONS 1-9: 0
SUM OF ALL RESPONSES TO PHQ9 QUESTIONS 1 & 2: 0
SUM OF ALL RESPONSES TO PHQ QUESTIONS 1-9: 0
2. FEELING DOWN, DEPRESSED OR HOPELESS: 0
SUM OF ALL RESPONSES TO PHQ QUESTIONS 1-9: 0
1. LITTLE INTEREST OR PLEASURE IN DOING THINGS: 0
SUM OF ALL RESPONSES TO PHQ QUESTIONS 1-9: 0

## 2022-03-16 NOTE — TELEPHONE ENCOUNTER
Pharmacy called. They cannot do or make or have available the Boric Acid Vaginal 600mg Supp. They do not know what pharmacy would have this but [facility] does. Would like to know what to substitute for it? Thank you.

## 2022-03-17 NOTE — TELEPHONE ENCOUNTER
So I found a [facility] which is right across from [facility] [address], I called mom and she said that that would be fine for her to  from there. Fax # [phone].

## 2022-06-30 ENCOUNTER — TELEPHONE (OUTPATIENT)
Dept: PRIMARY CARE CLINIC | Age: 27
End: 2022-06-30

## 2022-06-30 DIAGNOSIS — Z00.00 PREVENTATIVE HEALTH CARE: ICD-10-CM

## 2022-06-30 LAB
A/G RATIO: 2.1 (ref 1.1–2.2)
ALBUMIN SERPL-MCNC: 4.7 G/DL (ref 3.4–5)
ALP BLD-CCNC: 65 U/L (ref 40–129)
ALT SERPL-CCNC: 9 U/L (ref 10–40)
ANION GAP SERPL CALCULATED.3IONS-SCNC: 15 MMOL/L (ref 3–16)
AST SERPL-CCNC: 13 U/L (ref 15–37)
BASOPHILS ABSOLUTE: 0 K/UL (ref 0–0.2)
BASOPHILS RELATIVE PERCENT: 0.4 %
BILIRUB SERPL-MCNC: 0.4 MG/DL (ref 0–1)
BUN BLDV-MCNC: 11 MG/DL (ref 7–20)
CALCIUM SERPL-MCNC: 9.7 MG/DL (ref 8.3–10.6)
CHLORIDE BLD-SCNC: 103 MMOL/L (ref 99–110)
CHOLESTEROL, TOTAL: 170 MG/DL (ref 0–199)
CO2: 23 MMOL/L (ref 21–32)
CREAT SERPL-MCNC: 0.7 MG/DL (ref 0.6–1.1)
EOSINOPHILS ABSOLUTE: 0 K/UL (ref 0–0.6)
EOSINOPHILS RELATIVE PERCENT: 0.7 %
GFR AFRICAN AMERICAN: >60
GFR NON-AFRICAN AMERICAN: >60
GLUCOSE BLD-MCNC: 95 MG/DL (ref 70–99)
HCT VFR BLD CALC: 38.3 % (ref 36–48)
HDLC SERPL-MCNC: 38 MG/DL (ref 40–60)
HEMOGLOBIN: 13.4 G/DL (ref 12–16)
LDL CHOLESTEROL CALCULATED: 108 MG/DL
LYMPHOCYTES ABSOLUTE: 1.8 K/UL (ref 1–5.1)
LYMPHOCYTES RELATIVE PERCENT: 28.5 %
MCH RBC QN AUTO: 33.4 PG (ref 26–34)
MCHC RBC AUTO-ENTMCNC: 35.1 G/DL (ref 31–36)
MCV RBC AUTO: 95.2 FL (ref 80–100)
MONOCYTES ABSOLUTE: 0.2 K/UL (ref 0–1.3)
MONOCYTES RELATIVE PERCENT: 3.7 %
NEUTROPHILS ABSOLUTE: 4.2 K/UL (ref 1.7–7.7)
NEUTROPHILS RELATIVE PERCENT: 66.7 %
PDW BLD-RTO: 12.8 % (ref 12.4–15.4)
PLATELET # BLD: 242 K/UL (ref 135–450)
PMV BLD AUTO: 7.9 FL (ref 5–10.5)
POTASSIUM SERPL-SCNC: 4.3 MMOL/L (ref 3.5–5.1)
RBC # BLD: 4.02 M/UL (ref 4–5.2)
SODIUM BLD-SCNC: 141 MMOL/L (ref 136–145)
TOTAL PROTEIN: 6.9 G/DL (ref 6.4–8.2)
TRIGL SERPL-MCNC: 122 MG/DL (ref 0–150)
TSH REFLEX FT4: 2.31 UIU/ML (ref 0.27–4.2)
VLDLC SERPL CALC-MCNC: 24 MG/DL
WBC # BLD: 6.4 K/UL (ref 4–11)

## 2022-07-01 LAB
ESTIMATED AVERAGE GLUCOSE: 88.2 MG/DL
HBA1C MFR BLD: 4.7 %

## 2022-07-01 NOTE — TELEPHONE ENCOUNTER
Antibody or antigen test- what is she trying to find out? If symptoms needs antigen test via urgent care or [facility] has it. Takes up to 2 weeks to build antibodies.

## 2022-07-05 ENCOUNTER — OFFICE VISIT (OUTPATIENT)
Dept: PRIMARY CARE CLINIC | Age: 27
End: 2022-07-05
Payer: COMMERCIAL

## 2022-07-05 VITALS
TEMPERATURE: 98.3 F | SYSTOLIC BLOOD PRESSURE: 116 MMHG | RESPIRATION RATE: 16 BRPM | HEART RATE: 67 BPM | OXYGEN SATURATION: 99 % | HEIGHT: 68 IN | BODY MASS INDEX: 27.32 KG/M2 | DIASTOLIC BLOOD PRESSURE: 77 MMHG | WEIGHT: 180.25 LBS

## 2022-07-05 DIAGNOSIS — I49.9 IRREGULAR HEART BEAT: Primary | ICD-10-CM

## 2022-07-05 DIAGNOSIS — R41.89 BRAIN FOG: ICD-10-CM

## 2022-07-05 PROCEDURE — 99214 OFFICE O/P EST MOD 30 MIN: CPT | Performed by: FAMILY MEDICINE

## 2022-07-05 ASSESSMENT — ENCOUNTER SYMPTOMS
ABDOMINAL PAIN: 0
DIARRHEA: 0
RHINORRHEA: 0
SHORTNESS OF BREATH: 0
SORE THROAT: 0
CONSTIPATION: 0
CHEST TIGHTNESS: 0

## 2022-07-05 NOTE — PROGRESS NOTES
Subjective:   Patient ID: Armando Ni is a 31 y.o. female. HPI by clinical support staff:   Chief Complaint   Patient presents with    Fatigue     brain fog, keto diet for 2 months    Eye Problem        Preliminary data above this line collected by clinical support staff.    ______________________________________________________________________  HPI by Provider:   HPI   Patient presents today with concern of having foggy brain- had covid in January recovered well. Stopped keto diet last week but x 2 week has had foggy brain. 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  No snoring. Trying to conceive- gets anxious about most things wondering if her heart is okay has a history of bigeminy. Data above this line collected by Provider. Patient's medications, allergies, past medical, surgical, social and family histories were reviewed and updated as appropriate. Patient Care Team:  Rosemarie Claire MD as PCP - General (Family Medicine)  Rosemarie Claire MD as PCP - [facility] Empaneled Provider  Current Outpatient Medications on File Prior to Visit   Medication Sig Dispense Refill    albuterol sulfate  (90 Base) MCG/ACT inhaler every 6 hours as needed       No current facility-administered medications on file prior to visit. Review of Systems   Constitutional: Negative for activity change, appetite change, fatigue and fever. HENT: Negative for congestion, rhinorrhea and sore throat. Respiratory: Negative for chest tightness and shortness of breath. Cardiovascular: Negative for chest pain, palpitations and leg swelling. Gastrointestinal: Negative for abdominal pain, constipation and diarrhea. Genitourinary: Negative for dysuria and frequency. Musculoskeletal: Negative for arthralgias. Neurological: Negative for dizziness, weakness and headaches. Psychiatric/Behavioral: Negative for hallucinations. All other systems reviewed and are negative. ROS above this line reviewed by Provider. Objective:   /77 (Site: Right Upper Arm, Position: Sitting, Cuff Size: Medium Adult)   Pulse 67   Temp 98.3 °F (36.8 °C) (Temporal)   Resp 16   Ht 5' 8\" (1.727 m)   Wt 180 lb 4 oz (81.8 kg)   LMP 06/30/2022 (Exact Date)   SpO2 99%   BMI 27.41 kg/m²   Physical Exam  Vitals and nursing note reviewed. Constitutional:       General: She is not in acute distress. Appearance: Normal appearance. She is normal weight. She is not ill-appearing, toxic-appearing or diaphoretic. HENT:      Head: Normocephalic and atraumatic. Eyes:      General: No scleral icterus. Conjunctiva/sclera: Conjunctivae normal.   Cardiovascular:      Rate and Rhythm: Normal rate and regular rhythm. Heart sounds: Normal heart sounds. No murmur heard. No friction rub. No gallop. Pulmonary:      Effort: Pulmonary effort is normal. No respiratory distress. Breath sounds: Normal breath sounds. No stridor. No wheezing, rhonchi or rales. Musculoskeletal:      Cervical back: Normal range of motion. Skin:     General: Skin is warm and dry. Neurological:      Mental Status: She is alert. Psychiatric:         Mood and Affect: Mood normal.         Behavior: Behavior normal.        Assessment and Plan:   1. Brain fog  Likely residual from covid infection - consider sleep study if sleep quality not good to rule out  Other. 2. Irregular heart beat  History of bigeminy   - Holter Monitor 48 Hour; Future           This chart note was prepared using a voice recognition dictation program. This note was reviewed for accuracy; however, addition, deletion and sound-alike word errors may occur. If there are any questions regarding this chart note, please contact the originating provider. Electronically signed by   Bessy Phillips MD  7/5/2022   3:36 PM    Return if symptoms worsen or fail to improve.

## 2022-07-06 ENCOUNTER — PATIENT MESSAGE (OUTPATIENT)
Dept: PRIMARY CARE CLINIC | Age: 27
End: 2022-07-06

## 2022-07-06 LAB — SARS-COV-2 ANTIBODY, TOTAL: POSITIVE

## 2022-07-07 NOTE — TELEPHONE ENCOUNTER
From: Jaki Alcazar  To: Dr. Myles Case: 7/6/2022 6:54 PM EDT  Subject: Question regarding COVID-19, ANTIBODY, TOTAL    Hi Dr Nayeli Hoang, is there an actual reading of what my antibodies levels were? Im curious if they were high.

## 2024-05-09 NOTE — PROGRESS NOTES
Rectal bleeding noted 2-3 times approximately 3 weeks ago. Family hx of colon cancer includes maternal aunt. pas

## (undated) DEVICE — FORCEPS BX L240CM JAW DIA2.8MM L CAP W/ NDL MIC MESH TOOTH

## (undated) DEVICE — ERBE NESSY® OMEGA PLATE USA (85+23)CM² , WITH CABLE 3 M: Brand: ERBE

## (undated) DEVICE — SINGLE-USE POLYPECTOMY SNARE: Brand: CAPTIVATOR

## (undated) DEVICE — CANNULA SAMP CO2 AD GRN 7FT CO2 AND 7FT O2 TBNG UNIV CONN